# Patient Record
Sex: FEMALE | Race: WHITE | Employment: FULL TIME | URBAN - METROPOLITAN AREA
[De-identification: names, ages, dates, MRNs, and addresses within clinical notes are randomized per-mention and may not be internally consistent; named-entity substitution may affect disease eponyms.]

---

## 2019-05-15 ENCOUNTER — OFFICE VISIT (OUTPATIENT)
Dept: FAMILY MEDICINE CLINIC | Facility: CLINIC | Age: 29
End: 2019-05-15
Payer: COMMERCIAL

## 2019-05-15 VITALS
DIASTOLIC BLOOD PRESSURE: 72 MMHG | WEIGHT: 175.38 LBS | HEART RATE: 75 BPM | BODY MASS INDEX: 28.19 KG/M2 | SYSTOLIC BLOOD PRESSURE: 120 MMHG | HEIGHT: 66 IN

## 2019-05-15 DIAGNOSIS — Z00.00 ROUTINE MEDICAL EXAM: Primary | ICD-10-CM

## 2019-05-15 PROCEDURE — 99385 PREV VISIT NEW AGE 18-39: CPT | Performed by: FAMILY MEDICINE

## 2019-05-15 NOTE — PROGRESS NOTES
HPI:   Brandon Bean is a 26year old female who presents for a complete physical exam.    Here for regular physical. Exercising and eats healthy. Pap smear utd. Normal menses.    Possible skin CA  Wt Readings from Last 3 Encounters:  05/15/19 : 175 lb 6.4 auscultation  CARDIO: RRR without murmur  GI: good BS's,no masses, HSM or tenderness  MUSCULOSKELETAL: back is not tender,FROM of the back  EXTREMITIES: no cyanosis, clubbing or edema  NEURO: Oriented times three,cranial nerves are intact,motor and sensory

## 2019-06-08 ENCOUNTER — LAB ENCOUNTER (OUTPATIENT)
Dept: LAB | Age: 29
End: 2019-06-08
Attending: FAMILY MEDICINE
Payer: COMMERCIAL

## 2019-06-08 DIAGNOSIS — Z00.00 ROUTINE MEDICAL EXAM: ICD-10-CM

## 2019-06-08 PROCEDURE — 36415 COLL VENOUS BLD VENIPUNCTURE: CPT

## 2019-06-08 PROCEDURE — 82306 VITAMIN D 25 HYDROXY: CPT

## 2019-06-08 PROCEDURE — 80053 COMPREHEN METABOLIC PANEL: CPT

## 2019-06-08 PROCEDURE — 80061 LIPID PANEL: CPT

## 2019-06-08 PROCEDURE — 84443 ASSAY THYROID STIM HORMONE: CPT

## 2019-06-08 PROCEDURE — 82607 VITAMIN B-12: CPT

## 2019-06-08 PROCEDURE — 85025 COMPLETE CBC W/AUTO DIFF WBC: CPT

## 2019-07-31 ENCOUNTER — OFFICE VISIT (OUTPATIENT)
Dept: FAMILY MEDICINE CLINIC | Facility: CLINIC | Age: 29
End: 2019-07-31
Payer: COMMERCIAL

## 2019-07-31 VITALS
BODY MASS INDEX: 28.61 KG/M2 | WEIGHT: 178 LBS | SYSTOLIC BLOOD PRESSURE: 134 MMHG | TEMPERATURE: 99 F | DIASTOLIC BLOOD PRESSURE: 70 MMHG | HEART RATE: 74 BPM | HEIGHT: 66 IN

## 2019-07-31 DIAGNOSIS — Z01.419 ROUTINE GYNECOLOGICAL EXAMINATION: Primary | ICD-10-CM

## 2019-07-31 PROCEDURE — 99395 PREV VISIT EST AGE 18-39: CPT | Performed by: FAMILY MEDICINE

## 2019-07-31 NOTE — PROGRESS NOTES
HPI:   Viridiana Swan is a 26year old female who presents for a complete physical exam - gyne exam   pt states LMP in beginning of July but does not remember exact date. Here for regular gyne.  Pap smears always normal but been awhile   Wt Readings from kg/m²   Body mass index is 28.73 kg/m². GENERAL: well developed, well nourished,in no apparent distress  SKIN: no rashes,no suspicious lesions  Numerous moles.    EYES:PERRLA, EOMI,,conjunctiva are clear  NECK: supple,no adenopathy,no bruits  CHEST: no ch

## 2019-07-31 NOTE — ADDENDUM NOTE
Addended by: Flores Love on: 7/31/2019 05:28 PM     Modules accepted: Orders
For information on Fall & Injury Prevention, visit [email or web address]

## 2019-08-01 LAB — HPV I/H RISK 1 DNA SPEC QL NAA+PROBE: NEGATIVE

## 2019-08-05 NOTE — PROGRESS NOTES
Jazmyn - Normal pap.  Repeat in 5 years but you should still be having yearly physicals. -Dr. Sj Martinez